# Patient Record
Sex: MALE | Race: WHITE | NOT HISPANIC OR LATINO | URBAN - METROPOLITAN AREA
[De-identification: names, ages, dates, MRNs, and addresses within clinical notes are randomized per-mention and may not be internally consistent; named-entity substitution may affect disease eponyms.]

---

## 2019-11-10 ENCOUNTER — EMERGENCY (EMERGENCY)
Facility: HOSPITAL | Age: 64
LOS: 1 days | Discharge: HOME | End: 2019-11-10
Attending: EMERGENCY MEDICINE
Payer: COMMERCIAL

## 2019-11-10 VITALS
HEART RATE: 70 BPM | TEMPERATURE: 98 F | WEIGHT: 235.01 LBS | DIASTOLIC BLOOD PRESSURE: 92 MMHG | SYSTOLIC BLOOD PRESSURE: 167 MMHG | RESPIRATION RATE: 18 BRPM | OXYGEN SATURATION: 96 %

## 2019-11-10 VITALS
SYSTOLIC BLOOD PRESSURE: 125 MMHG | DIASTOLIC BLOOD PRESSURE: 85 MMHG | HEART RATE: 65 BPM | RESPIRATION RATE: 14 BRPM | TEMPERATURE: 98 F | OXYGEN SATURATION: 98 %

## 2019-11-10 PROCEDURE — 76376 3D RENDER W/INTRP POSTPROCES: CPT | Mod: 26

## 2019-11-10 PROCEDURE — 99284 EMERGENCY DEPT VISIT MOD MDM: CPT | Mod: 25

## 2019-11-10 PROCEDURE — 70450 CT HEAD/BRAIN W/O DYE: CPT

## 2019-11-10 PROCEDURE — 90471 IMMUNIZATION ADMIN: CPT

## 2019-11-10 PROCEDURE — 90715 TDAP VACCINE 7 YRS/> IM: CPT

## 2019-11-10 PROCEDURE — 70486 CT MAXILLOFACIAL W/O DYE: CPT

## 2019-11-10 PROCEDURE — 93005 ELECTROCARDIOGRAM TRACING: CPT

## 2019-11-10 PROCEDURE — 93010 ELECTROCARDIOGRAM REPORT: CPT

## 2019-11-10 PROCEDURE — 70486 CT MAXILLOFACIAL W/O DYE: CPT | Mod: 26

## 2019-11-10 PROCEDURE — 73110 X-RAY EXAM OF WRIST: CPT

## 2019-11-10 PROCEDURE — 76376 3D RENDER W/INTRP POSTPROCES: CPT

## 2019-11-10 PROCEDURE — 70450 CT HEAD/BRAIN W/O DYE: CPT | Mod: 26

## 2019-11-10 PROCEDURE — 99284 EMERGENCY DEPT VISIT MOD MDM: CPT

## 2019-11-10 PROCEDURE — 73110 X-RAY EXAM OF WRIST: CPT | Mod: 26,RT

## 2019-11-10 RX ORDER — TETANUS TOXOID, REDUCED DIPHTHERIA TOXOID AND ACELLULAR PERTUSSIS VACCINE, ADSORBED 5; 2.5; 8; 8; 2.5 [IU]/.5ML; [IU]/.5ML; UG/.5ML; UG/.5ML; UG/.5ML
0.5 SUSPENSION INTRAMUSCULAR ONCE
Refills: 0 | Status: COMPLETED | OUTPATIENT
Start: 2019-11-10 | End: 2019-11-10

## 2019-11-10 RX ADMIN — TETANUS TOXOID, REDUCED DIPHTHERIA TOXOID AND ACELLULAR PERTUSSIS VACCINE, ADSORBED 0.5 MILLILITER(S): 5; 2.5; 8; 8; 2.5 SUSPENSION INTRAMUSCULAR at 17:26
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
## 2019-11-10 NOTE — ED PROVIDER NOTE - CARE PROVIDER_API CALL
Duarte Iraheta (MD)  Plastic Surgery  [address] [place] [address]  Phone: [phone]  Fax: [phone]  Follow Up Time:
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
## 2019-11-10 NOTE — ED PROVIDER NOTE - PATIENT PORTAL LINK FT
You can access the FollowMyHealth Patient Portal offered by [facility] by registering at the following website: [email or web address]. By joining [employer]’s FollowMyHealth portal, you will also be able to view your health information using other applications (apps) compatible with our system.

## 2019-11-10 NOTE — ED PROVIDER NOTE - CARE PLAN
Principal Discharge DX:	Facial fracture  Assessment and plan of treatment:	Thank you for visiting our Emergency Department, it has been a pleasure taking part in your healthcare.    Your discharge diagnosis is: facial fracture  Please take all discharge medications as indicated below:  Take Motrin/Tylenol for pain as needed, please follow instructions on manufacturers label. If you have any questions please consult a pharmacist or your PMD.  Please follow up with your PMD within x48 hours.  Please follow up with plastic surgery within x48 hours  A copy of resulted labs, imaging, and findings have been provided to you.   You have had a detailed discussion with your provider regarding your diagnosis, care management and discharge planning including, but not limited to: return precautions, follow up visits with existing or new providers, new prescriptions and/or medication changes, wound and/or splint/cast care or other care   aspects specific to your diagnosis and treatment. You have been given the opportunity to have your questions answered. At this time you have been deemed stable and fit for discharge.  Return precautions to the Emergency Department include but are not limited to: unrelenting nausea, vomiting, fever, chills, chest pain, shortness of breath, dizziness, chest or abdominal pain, worsening back pain, syncope, blood in urine or stool, headache that doesn't resolve, numbness or tingling, loss of sensation, loss of motor function, or any other concerning symptoms. Principal Discharge DX:	Facial fracture  Goal:	Left orbital floor and maxillary sinus ant/lat walls fx  Assessment and plan of treatment:	Thank you for visiting our Emergency Department, it has been a pleasure taking part in your healthcare.    Your discharge diagnosis is: facial fracture  Please take all discharge medications as indicated below:  Take Motrin/Tylenol for pain as needed, please follow instructions on manufacturers label. If you have any questions please consult a pharmacist or your PMD.  Please follow up with your PMD within x48 hours.  Please follow up with plastic surgery within x48 hours  A copy of resulted labs, imaging, and findings have been provided to you.   You have had a detailed discussion with your provider regarding your diagnosis, care management and discharge planning including, but not limited to: return precautions, follow up visits with existing or new providers, new prescriptions and/or medication changes, wound and/or splint/cast care or other care   aspects specific to your diagnosis and treatment. You have been given the opportunity to have your questions answered. At this time you have been deemed stable and fit for discharge.  Return precautions to the Emergency Department include but are not limited to: unrelenting nausea, vomiting, fever, chills, chest pain, shortness of breath, dizziness, chest or abdominal pain, worsening back pain, syncope, blood in urine or stool, headache that doesn't resolve, numbness or tingling, loss of sensation, loss of motor function, or any other concerning symptoms.

## 2019-11-10 NOTE — ED PROVIDER NOTE - PROGRESS NOTE DETAILS
Baron PGY3: Pt assessed at beside. Pt resting comfortably, pain controlled, pt questions answered. Vital signs stable. Rediscussed with plastic surgery okay to dc w follow up in office x1 week, informed pt, understands plan agrees with plan for dc.

## 2019-11-10 NOTE — ED PROVIDER NOTE - PLAN OF CARE
Thank you for visiting our Emergency Department, it has been a pleasure taking part in your healthcare.    Your discharge diagnosis is: facial fracture  Please take all discharge medications as indicated below:  Take Motrin/Tylenol for pain as needed, please follow instructions on manufacturers label. If you have any questions please consult a pharmacist or your PMD.  Please follow up with your PMD within x48 hours.  Please follow up with plastic surgery within x48 hours  A copy of resulted labs, imaging, and findings have been provided to you.   You have had a detailed discussion with your provider regarding your diagnosis, care management and discharge planning including, but not limited to: return precautions, follow up visits with existing or new providers, new prescriptions and/or medication changes, wound and/or splint/cast care or other care   aspects specific to your diagnosis and treatment. You have been given the opportunity to have your questions answered. At this time you have been deemed stable and fit for discharge.  Return precautions to the Emergency Department include but are not limited to: unrelenting nausea, vomiting, fever, chills, chest pain, shortness of breath, dizziness, chest or abdominal pain, worsening back pain, syncope, blood in urine or stool, headache that doesn't resolve, numbness or tingling, loss of sensation, loss of motor function, or any other concerning symptoms. Left orbital floor and maxillary sinus ant/lat walls fx
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
## 2019-11-10 NOTE — ED PROVIDER NOTE - NSFOLLOWUPINSTRUCTIONS_ED_ALL_ED_FT
Thank you for visiting our Emergency Department, it has been a pleasure taking part in your healthcare.    Your discharge diagnosis is: facial fracture  Please take all discharge medications as indicated below:  Take Motrin/Tylenol for pain as needed, please follow instructions on manufacturers label. If you have any questions please consult a pharmacist or your PMD.  Please follow up with your PMD within x48 hours.  Please follow up with plastic surgery within x48 hours  A copy of resulted labs, imaging, and findings have been provided to you.   You have had a detailed discussion with your provider regarding your diagnosis, care management and discharge planning including, but not limited to: return precautions, follow up visits with existing or new providers, new prescriptions and/or medication changes, wound and/or splint/cast care or other care   aspects specific to your diagnosis and treatment. You have been given the opportunity to have your questions answered. At this time you have been deemed stable and fit for discharge.  Return precautions to the Emergency Department include but are not limited to: unrelenting nausea, vomiting, fever, chills, chest pain, shortness of breath, dizziness, chest or abdominal pain, worsening back pain, syncope, blood in urine or stool, headache that doesn't resolve, numbness or tingling, loss of sensation, loss of motor function, or any other concerning symptoms.

## 2019-11-10 NOTE — ED PROVIDER NOTE - PHYSICAL EXAMINATION
GEN: NAD, awake, well appearing  HEENT: NCAT, MMM, normal conjunctiva, perrl, EOM intact without pain   CHEST/LUNGS: Non-tachypneic, CTAB, bilateral breath sounds  CARDIAC: Non-tachycardic, s1s2, normal perfusion, no peripheral edema  ABDOMEN: Soft, NTND, No rebound/guarding  MSK: No joint tenderness, no gross deformity of extremities  SKIN: small abrasion to left forehead, right hand, left knee. Ecchymosis around left eye  NEURO: CN grossly intact, normal coordination, no focal motor or sensory deficits  PSYCH: Alert, appropriate, cooperative GEN: NAD, awake, well appearing  HEENT: EOMI, PERRL. +L periorbital ecchmosis, swelling, ttp.  CHEST/LUNGS: Non-tachypneic, CTAB, bilateral breath sounds  CARDIAC: Non-tachycardic, s1s2, normal perfusion, no peripheral edema  ABDOMEN: Soft, NTND, No rebound/guarding  MSK: No joint tenderness, no gross deformity of extremities  SKIN: small abrasion to left forehead, right hand, left knee. Ecchymosis around left eye  NEURO: CN grossly intact, normal coordination, no focal motor or sensory deficits  PSYCH: Alert, appropriate, cooperative
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
## 2019-11-10 NOTE — ED ADULT NURSE NOTE - CHPI ED NUR SYMPTOMS NEG
no nausea/no decreased eating/drinking/no fever/no tingling/no weakness/no chills/no vomiting/no dizziness

## 2020-08-11 NOTE — ED PROVIDER NOTE - NS ED ROS FT
Detail Level: Zone GENERAL: No fever or chills  EYES: no change in vision  HEENT: no trouble swallowing or speaking  CARDIAC: no chest pain or palpitations  PULMONARY: no cough or SOB  GI: no abdominal pain, nausea, vomiting, diarrhea, or constipation   : No changes in urination  SKIN: abrasions, bruising   NEURO: no headache, numbness, or weakness  MSK: No joint pain

## 2024-10-15 NOTE — ED ADULT NURSE NOTE - SUICIDE SCREENING QUESTION 1
Best Garcia  : 1977  Primary: [employer]  Secondary:  [facility] [employer] [place] [address]  Phone: [phone]  Fax: [phone] Plan Frequency: Two times a week for 90 days    Plan of Care/Certification Expiration Date: 24             Plan of Care/Certification Expiration Date:  Plan of Care/Certification Expiration Date: 24    Frequency/Duration:   Plan Frequency: Two times a week for 90 days      Time In/Out:   Time In: 1650  Time Out: 1730      PT Visit Info:    Progress Note Due Date: 24  Total # of Visits to Date: 52  Progress Note Counter: 5      Visit Count: 52    OUTPATIENT PHYSICAL THERAPY:   Treatment Note 10/15/2024       Episode  (PT-Right ACL repair)               Treatment Diagnosis:    Right knee pain, unspecified chronicity  Difficulty in walking, not elsewhere classified  Medical/Referring Diagnosis:    Rupture of anterior cruciate ligament of right knee, initial encounter  Acute lateral meniscus tear of right knee, initial encounter  Injury of right knee, initial encounter  Referring Physician: Alejandra Govea PA MD Orders: PT Eval and Treat   Return MD Appt: 24  Date of Onset: 3/13/24 (DOS)  Allergies: Patient has no known allergies.  Restrictions/Precautions:   Post Surgical Precautions: Per Dr. Veronica's protocol      Interventions Planned (Treatment may consist of any combination of the following):   See eval note for interventions.    Subjective Comments: Patient reports he's having a little pain today in both knees. He thinks he's having some pain from trying the running intervals.    Initial Pain Level:   Knee 1/10  Post Session Pain Level:   Knee 1/10     Medications Last Reviewed: 10/15/2024    Updated Objective Findings:   Forward: 21 in R, 22 in L  PM: 33 in R, 32 in L  PL: 32 in R, 33 in L    Treatment     THERAPEUTIC EXERCISE: (39 minutes): Exercises per grid  No